# Patient Record
Sex: FEMALE | Race: WHITE | NOT HISPANIC OR LATINO | Employment: UNEMPLOYED | URBAN - METROPOLITAN AREA
[De-identification: names, ages, dates, MRNs, and addresses within clinical notes are randomized per-mention and may not be internally consistent; named-entity substitution may affect disease eponyms.]

---

## 2022-04-11 ENCOUNTER — HISTORICAL (OUTPATIENT)
Dept: ADMINISTRATIVE | Facility: HOSPITAL | Age: 42
End: 2022-04-11

## 2022-04-24 VITALS
BODY MASS INDEX: 28.49 KG/M2 | HEIGHT: 65 IN | WEIGHT: 171 LBS | DIASTOLIC BLOOD PRESSURE: 62 MMHG | SYSTOLIC BLOOD PRESSURE: 110 MMHG

## 2023-04-25 ENCOUNTER — DOCUMENTATION ONLY (OUTPATIENT)
Dept: OBSTETRICS AND GYNECOLOGY | Facility: CLINIC | Age: 43
End: 2023-04-25

## 2023-04-25 ENCOUNTER — TELEPHONE (OUTPATIENT)
Dept: OBSTETRICS AND GYNECOLOGY | Facility: CLINIC | Age: 43
End: 2023-04-25

## 2023-04-25 ENCOUNTER — OFFICE VISIT (OUTPATIENT)
Dept: OBSTETRICS AND GYNECOLOGY | Facility: CLINIC | Age: 43
End: 2023-04-25
Payer: COMMERCIAL

## 2023-04-25 VITALS
OXYGEN SATURATION: 99 % | DIASTOLIC BLOOD PRESSURE: 62 MMHG | HEIGHT: 64 IN | RESPIRATION RATE: 18 BRPM | BODY MASS INDEX: 24.84 KG/M2 | SYSTOLIC BLOOD PRESSURE: 106 MMHG | WEIGHT: 145.5 LBS | HEART RATE: 87 BPM

## 2023-04-25 DIAGNOSIS — Z30.9 ENCOUNTER FOR CONTRACEPTIVE MANAGEMENT, UNSPECIFIED TYPE: ICD-10-CM

## 2023-04-25 DIAGNOSIS — Z01.419 WOMEN'S ANNUAL ROUTINE GYNECOLOGICAL EXAMINATION: Primary | ICD-10-CM

## 2023-04-25 DIAGNOSIS — R68.82 DECREASED LIBIDO: ICD-10-CM

## 2023-04-25 PROCEDURE — 1159F MED LIST DOCD IN RCRD: CPT | Mod: CPTII,,, | Performed by: NURSE PRACTITIONER

## 2023-04-25 PROCEDURE — 3008F BODY MASS INDEX DOCD: CPT | Mod: CPTII,,, | Performed by: NURSE PRACTITIONER

## 2023-04-25 PROCEDURE — 1160F PR REVIEW ALL MEDS BY PRESCRIBER/CLIN PHARMACIST DOCUMENTED: ICD-10-PCS | Mod: CPTII,,, | Performed by: NURSE PRACTITIONER

## 2023-04-25 PROCEDURE — 3078F DIAST BP <80 MM HG: CPT | Mod: CPTII,,, | Performed by: NURSE PRACTITIONER

## 2023-04-25 PROCEDURE — 3074F SYST BP LT 130 MM HG: CPT | Mod: CPTII,,, | Performed by: NURSE PRACTITIONER

## 2023-04-25 PROCEDURE — 3008F PR BODY MASS INDEX (BMI) DOCUMENTED: ICD-10-PCS | Mod: CPTII,,, | Performed by: NURSE PRACTITIONER

## 2023-04-25 PROCEDURE — 99396 PR PREVENTIVE VISIT,EST,40-64: ICD-10-PCS | Mod: ,,, | Performed by: NURSE PRACTITIONER

## 2023-04-25 PROCEDURE — 3078F PR MOST RECENT DIASTOLIC BLOOD PRESSURE < 80 MM HG: ICD-10-PCS | Mod: CPTII,,, | Performed by: NURSE PRACTITIONER

## 2023-04-25 PROCEDURE — 99396 PREV VISIT EST AGE 40-64: CPT | Mod: ,,, | Performed by: NURSE PRACTITIONER

## 2023-04-25 PROCEDURE — 1160F RVW MEDS BY RX/DR IN RCRD: CPT | Mod: CPTII,,, | Performed by: NURSE PRACTITIONER

## 2023-04-25 PROCEDURE — 1159F PR MEDICATION LIST DOCUMENTED IN MEDICAL RECORD: ICD-10-PCS | Mod: CPTII,,, | Performed by: NURSE PRACTITIONER

## 2023-04-25 PROCEDURE — 3074F PR MOST RECENT SYSTOLIC BLOOD PRESSURE < 130 MM HG: ICD-10-PCS | Mod: CPTII,,, | Performed by: NURSE PRACTITIONER

## 2023-04-25 NOTE — PROGRESS NOTES
"Pharmacy called Mirena not covered by insurance . Patient states" will get cash price and let us know what she decides"   "

## 2023-04-25 NOTE — PROGRESS NOTES
Patient ID: [id number]   Chief Complaint: Annual exam  Chief Complaint   Patient presents with    Well Woman     No c/o's. Wants to talk about birth control     HPI:   Hamida Cantor is a 42 y.o. year old  here for her Annual Exam. Patient's last menstrual period was 04/15/2023 (exact date). She is doing well. Denies any health changes. Well Woman (No c/o's. Wants to talk about birth control)    Subjective:   History reviewed. No pertinent past medical history.  History reviewed. No pertinent surgical history.  Social History     Tobacco Use    Smoking status: Every Day     Types: Cigarettes, Vaping with nicotine     Start date:      Passive exposure: Current    Smokeless tobacco: Never   Substance Use Topics    Alcohol use: Yes     Comment: social    Drug use: Never     Family History   Problem Relation Age of Onset    Hypertension Father     Breast cancer Mother      OB History    Para Term  AB Living   6 6 6     6   SAB IAB Ectopic Multiple Live Births           6      # Outcome Date GA Lbr Jj/2nd Weight Sex Delivery Anes PTL Lv   6 Term 10/12/10 38w0d   M Vag-Spont EPI  SHANAE   5 Term 07 38w0d   F Vag-Spont EPI  SHANAE   4 Term 06 38w0d   F Vag-Spont EPI  SHANAE   3 Term 02 38w0d   F Vag-Spont EPI  SHANAE   2 Term 00 38w0d   M Vag-Spont EPI  SHANAE   1 Term 95 38w0d   F Vag-Spont EPI  SHANAE     No current outpatient medications on file.  MENARCHEAL:  Cycle Length: 5 days   Flow: normal  Dysmenorrhea: No  If yes: Mild  Intermenstrual Bleeding: No  PAP:  Last PAP: 12/3/2021  History of Abnormal PAP Smear: no  Treated: n/a  HPV Vaccine: NO  INTERCOURSE:  Dyspareunia: No  Postcoital Bleeding: No  History of STI: No   If yes, then: No   Current Birth Control Method: none  Sexually Active: yes  BREAST HISTORY:   Last Mammogram: 3/3/2022  History of Abnormal Mammogram: NO    Review of Systems 12 point review of systems conducted, negative except as stated in the history of present  "illness. See HPI for details.  Objective:   Visit Vitals  /62 (BP Location: Right arm)   Pulse 87   Resp 18   Ht 5' 4" (1.626 m)   Wt 66 kg (145 lb 8.1 oz)   LMP 04/15/2023 (Exact Date)   SpO2 99%   BMI 24.98 kg/m²     Physical Exam:  Physical Exam  Constitutional:  General Appearance : alert, in no acute distress, normal, well nourished.  Respiratory:  Respiratory Effort: normal.  Breast:  Right: Inspection/palpation: no discharge, no masses present, no nipple retraction, no skin changes, no skin dimpling, no tenderness, no lymphadenopathy, no axillary mass, no axillary tenderness.  Left: Inspection/palpation: no discharge, no masses present, no nipple retraction, no skin changes, no skin dimpling, no tenderness, no lymphadenopathy, no axillary mass, no axillary tenderness.  Gastrointestinal:  Abdomen: no masses. no tender, nondistended.  Liver and spleen: normal  Hernias: no hernias present, no inguinal adenopathy.  Genitourinary:  External Genitalia: normal, no lesions.  Vagina: normal appearance, no abnormal discharge, no lesions. Pt has area on labia that she states "it gets in the way and I often think it is my tampon string." Pt will rtc for iud insertion and will discuss with dr xiao about option to removing it   Bladder: no mass, nontender.  Urethra: no erythema or lesions present.  Cervix: no lesions, non tender. Pap Done  Uterus: nontender, normal contour, normal mobility, normal size.   Adnexa: no masses, no tenderness.  Anus and Perineum: visually normal.   Chaperone Present    No results found for this or any previous visit (from the past 24 hour(s)).  Assessment/Plan:   Assessment:   Women's annual routine gynecological examination  -     Liquid-Based Pap Smear, Screening Screening  -     Mammo Digital Screening Bilat; Future; Expected date: 04/25/2023    Encounter for contraceptive management, unspecified type  Order mirena    Decreased libido  Compound testosterone to [facility]      Follow up in about 1 year (around 4/25/2024) for ANNUAL. In addition to their scheduled FU, the patient has also been instructed to follow up on as needed basis  All questions were answered and the patient voiced understanding of the above issues.

## 2023-04-25 NOTE — TELEPHONE ENCOUNTER
----- Message from Lacy Lejeune, MA sent at 4/25/2023  1:19 PM CDT -----  Pt is calling because she has questions about the iud

## 2023-04-28 LAB — PSYCHE PATHOLOGY RESULT: NORMAL

## 2023-05-03 ENCOUNTER — TELEPHONE (OUTPATIENT)
Dept: OBSTETRICS AND GYNECOLOGY | Facility: CLINIC | Age: 43
End: 2023-05-03
Payer: COMMERCIAL

## 2023-05-03 NOTE — TELEPHONE ENCOUNTER
Returned pt. call  confirmed. [facility] pharmacy did not have rx for testosterone cream. Instructed pt. was sent to [facility] pharmacy in [place] due to her pharmacy not doing compounding.pt stated was not aware it was at [facility] and will call them.

## 2023-05-03 NOTE — TELEPHONE ENCOUNTER
----- Message from Lacy Lejeune, MA sent at 5/3/2023  1:24 PM CDT -----  Pt is stating she came for an apt last week see lars, and a script for some cream was suppose to be called in but the pharmacy states they never received anything, community pharmacy in [place]

## 2024-08-06 ENCOUNTER — OFFICE VISIT (OUTPATIENT)
Dept: OBSTETRICS AND GYNECOLOGY | Facility: CLINIC | Age: 44
End: 2024-08-06
Payer: COMMERCIAL

## 2024-08-06 VITALS
DIASTOLIC BLOOD PRESSURE: 70 MMHG | SYSTOLIC BLOOD PRESSURE: 120 MMHG | BODY MASS INDEX: 23.69 KG/M2 | WEIGHT: 138 LBS | HEART RATE: 86 BPM

## 2024-08-06 DIAGNOSIS — Z12.39 ENCOUNTER FOR SCREENING FOR MALIGNANT NEOPLASM OF BREAST, UNSPECIFIED SCREENING MODALITY: ICD-10-CM

## 2024-08-06 DIAGNOSIS — Z01.419 ROUTINE GYNECOLOGICAL EXAMINATION: Primary | ICD-10-CM

## 2024-08-06 DIAGNOSIS — Z11.3 ENCOUNTER FOR SCREENING FOR INFECTIONS WITH A PREDOMINANTLY SEXUAL MODE OF TRANSMISSION: ICD-10-CM

## 2024-08-06 PROCEDURE — 87491 CHLMYD TRACH DNA AMP PROBE: CPT | Performed by: OBSTETRICS & GYNECOLOGY

## 2024-08-06 PROCEDURE — 3074F SYST BP LT 130 MM HG: CPT | Mod: CPTII,,, | Performed by: OBSTETRICS & GYNECOLOGY

## 2024-08-06 PROCEDURE — 1160F RVW MEDS BY RX/DR IN RCRD: CPT | Mod: CPTII,,, | Performed by: OBSTETRICS & GYNECOLOGY

## 2024-08-06 PROCEDURE — 87591 N.GONORRHOEAE DNA AMP PROB: CPT | Performed by: OBSTETRICS & GYNECOLOGY

## 2024-08-06 PROCEDURE — 3078F DIAST BP <80 MM HG: CPT | Mod: CPTII,,, | Performed by: OBSTETRICS & GYNECOLOGY

## 2024-08-06 PROCEDURE — 87661 TRICHOMONAS VAGINALIS AMPLIF: CPT | Performed by: OBSTETRICS & GYNECOLOGY

## 2024-08-06 PROCEDURE — 1159F MED LIST DOCD IN RCRD: CPT | Mod: CPTII,,, | Performed by: OBSTETRICS & GYNECOLOGY

## 2024-08-06 PROCEDURE — 3008F BODY MASS INDEX DOCD: CPT | Mod: CPTII,,, | Performed by: OBSTETRICS & GYNECOLOGY

## 2024-08-06 PROCEDURE — 99396 PREV VISIT EST AGE 40-64: CPT | Mod: ,,, | Performed by: OBSTETRICS & GYNECOLOGY

## 2024-08-06 RX ORDER — DEXTROAMPHETAMINE SACCHARATE, AMPHETAMINE ASPARTATE MONOHYDRATE, DEXTROAMPHETAMINE SULFATE AND AMPHETAMINE SULFATE 5; 5; 5; 5 MG/1; MG/1; MG/1; MG/1
20 CAPSULE, EXTENDED RELEASE ORAL
COMMUNITY
Start: 2024-02-27 | End: 2024-08-10